# Patient Record
Sex: MALE | Race: OTHER | NOT HISPANIC OR LATINO | Employment: OTHER | URBAN - METROPOLITAN AREA
[De-identification: names, ages, dates, MRNs, and addresses within clinical notes are randomized per-mention and may not be internally consistent; named-entity substitution may affect disease eponyms.]

---

## 2024-08-03 ENCOUNTER — APPOINTMENT (EMERGENCY)
Dept: RADIOLOGY | Facility: HOSPITAL | Age: 44
End: 2024-08-03
Payer: COMMERCIAL

## 2024-08-03 ENCOUNTER — HOSPITAL ENCOUNTER (EMERGENCY)
Facility: HOSPITAL | Age: 44
Discharge: HOME | End: 2024-08-03
Attending: EMERGENCY MEDICINE | Admitting: EMERGENCY MEDICINE
Payer: COMMERCIAL

## 2024-08-03 VITALS
BODY MASS INDEX: 29.01 KG/M2 | OXYGEN SATURATION: 100 % | WEIGHT: 207.23 LBS | HEIGHT: 71 IN | RESPIRATION RATE: 21 BRPM | HEART RATE: 68 BPM | SYSTOLIC BLOOD PRESSURE: 128 MMHG | DIASTOLIC BLOOD PRESSURE: 77 MMHG | TEMPERATURE: 98.4 F

## 2024-08-03 DIAGNOSIS — S43.006A SHOULDER DISLOCATION: Primary | ICD-10-CM

## 2024-08-03 PROCEDURE — 96365 THER/PROPH/DIAG IV INF INIT: CPT

## 2024-08-03 PROCEDURE — 99285 EMERGENCY DEPT VISIT HI MDM: CPT | Performed by: EMERGENCY MEDICINE

## 2024-08-03 PROCEDURE — 96375 TX/PRO/DX INJ NEW DRUG ADDON: CPT

## 2024-08-03 PROCEDURE — 99284 EMERGENCY DEPT VISIT MOD MDM: CPT

## 2024-08-03 PROCEDURE — 23650 CLTX SHO DSLC W/MNPJ WO ANES: CPT | Performed by: EMERGENCY MEDICINE

## 2024-08-03 PROCEDURE — 73030 X-RAY EXAM OF SHOULDER: CPT

## 2024-08-03 PROCEDURE — 73020 X-RAY EXAM OF SHOULDER: CPT

## 2024-08-03 PROCEDURE — 73060 X-RAY EXAM OF HUMERUS: CPT

## 2024-08-03 PROCEDURE — 96361 HYDRATE IV INFUSION ADD-ON: CPT

## 2024-08-03 RX ORDER — ACETAMINOPHEN 10 MG/ML
1000 INJECTION, SOLUTION INTRAVENOUS ONCE
Status: COMPLETED | OUTPATIENT
Start: 2024-08-03 | End: 2024-08-03

## 2024-08-03 RX ORDER — LIDOCAINE HYDROCHLORIDE AND EPINEPHRINE 10; 10 MG/ML; UG/ML
10 INJECTION, SOLUTION INFILTRATION; PERINEURAL ONCE
Status: COMPLETED | OUTPATIENT
Start: 2024-08-03 | End: 2024-08-03

## 2024-08-03 RX ORDER — KETOROLAC TROMETHAMINE 30 MG/ML
15 INJECTION, SOLUTION INTRAMUSCULAR; INTRAVENOUS ONCE
Status: COMPLETED | OUTPATIENT
Start: 2024-08-03 | End: 2024-08-03

## 2024-08-03 RX ORDER — PROPOFOL 10 MG/ML
0.5 INJECTION, EMULSION INTRAVENOUS ONCE
Status: COMPLETED | OUTPATIENT
Start: 2024-08-03 | End: 2024-08-03

## 2024-08-03 RX ORDER — MORPHINE SULFATE 10 MG/ML
6 INJECTION, SOLUTION INTRAMUSCULAR; INTRAVENOUS ONCE
Status: COMPLETED | OUTPATIENT
Start: 2024-08-03 | End: 2024-08-03

## 2024-08-03 RX ORDER — MORPHINE SULFATE 15 MG/1
15 TABLET ORAL EVERY 6 HOURS PRN
Qty: 10 TABLET | Refills: 0 | Status: SHIPPED | OUTPATIENT
Start: 2024-08-03

## 2024-08-03 RX ORDER — PROPOFOL 10 MG/ML
1 INJECTION, EMULSION INTRAVENOUS ONCE
Status: COMPLETED | OUTPATIENT
Start: 2024-08-03 | End: 2024-08-03

## 2024-08-03 RX ADMIN — MORPHINE SULFATE 6 MG: 10 INJECTION INTRAVENOUS at 15:40

## 2024-08-03 RX ADMIN — PROPOFOL 94 MG: 10 INJECTION, EMULSION INTRAVENOUS at 17:42

## 2024-08-03 RX ADMIN — PROPOFOL 47 MG: 10 INJECTION, EMULSION INTRAVENOUS at 17:30

## 2024-08-03 RX ADMIN — ACETAMINOPHEN 1000 MG: 10 INJECTION INTRAVENOUS at 15:42

## 2024-08-03 RX ADMIN — LIDOCAINE HYDROCHLORIDE,EPINEPHRINE BITARTRATE 10 ML: 10; .01 INJECTION, SOLUTION INFILTRATION; PERINEURAL at 17:13

## 2024-08-03 RX ADMIN — SODIUM CHLORIDE 1000 ML: 0.9 INJECTION, SOLUTION INTRAVENOUS at 15:43

## 2024-08-03 RX ADMIN — PROPOFOL 47 MG: 10 INJECTION, EMULSION INTRAVENOUS at 17:43

## 2024-08-03 RX ADMIN — KETOROLAC TROMETHAMINE 15 MG: 30 INJECTION, SOLUTION INTRAMUSCULAR at 15:39

## 2024-08-03 RX ADMIN — PROPOFOL 94 MG: 10 INJECTION, EMULSION INTRAVENOUS at 17:25
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
